# Patient Record
Sex: FEMALE | URBAN - METROPOLITAN AREA
[De-identification: names, ages, dates, MRNs, and addresses within clinical notes are randomized per-mention and may not be internally consistent; named-entity substitution may affect disease eponyms.]

---

## 2018-01-16 NOTE — MISCELLANEOUS
Message   Recorded as Task   Date: 10/26/2016 10:34 AM, Created By: Mirna Medina   Task Name: Med Renewal Request   Assigned To: Radha Wu   Regarding Patient: Екатерина Schmidt, Status: Active   Comment:    Mirna Medina - 26 Oct 2016 10:34 AM     TASK CREATED  pt is going to has a bone Marrow biopsy  Pt will like to speak to you urgent, please call  [phone]   Deisi Chin - 26 Oct 2016 4:33 PM     TASK EDITED                 pt stopped by re previous message   lr   Radha Wu - 28 Oct 2016 5:20 PM     TASK EDITED  Pt wanted to inform me that she was recently seen by her oncologist and had a repeat BM biopsy 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

## 2025-06-12 ENCOUNTER — NEW REFERRAL (OUTPATIENT)
Dept: URBAN - METROPOLITAN AREA CLINIC 8 | Age: 60
End: 2025-06-12

## 2025-06-12 VITALS — SYSTOLIC BLOOD PRESSURE: 137 MMHG | DIASTOLIC BLOOD PRESSURE: 77 MMHG | HEIGHT: 55 IN

## 2025-06-12 DIAGNOSIS — H43.813: ICD-10-CM

## 2025-06-12 DIAGNOSIS — H25.13: ICD-10-CM

## 2025-06-12 DIAGNOSIS — H31.092: ICD-10-CM

## 2025-06-12 DIAGNOSIS — H40.053: ICD-10-CM

## 2025-06-12 DIAGNOSIS — H35.373: ICD-10-CM

## 2025-06-12 PROCEDURE — 92134 CPTRZ OPH DX IMG PST SGM RTA: CPT

## 2025-06-12 PROCEDURE — 99204 OFFICE O/P NEW MOD 45 MIN: CPT

## 2025-06-12 PROCEDURE — 92201 OPSCPY EXTND RTA DRAW UNI/BI: CPT

## 2025-06-12 ASSESSMENT — VISUAL ACUITY
OS_CC: 20/25+1
OD_CC: 20/40
OD_PH: 20/30-2

## 2025-06-12 ASSESSMENT — TONOMETRY
OS_IOP_MMHG: 32
OD_IOP_MMHG: 31